# Patient Record
Sex: FEMALE | Race: WHITE | NOT HISPANIC OR LATINO | URBAN - METROPOLITAN AREA
[De-identification: names, ages, dates, MRNs, and addresses within clinical notes are randomized per-mention and may not be internally consistent; named-entity substitution may affect disease eponyms.]

---

## 2017-01-05 ENCOUNTER — IMPORTED ENCOUNTER (OUTPATIENT)
Dept: URBAN - METROPOLITAN AREA CLINIC 50 | Facility: CLINIC | Age: 72
End: 2017-01-05

## 2017-01-12 ENCOUNTER — IMPORTED ENCOUNTER (OUTPATIENT)
Dept: URBAN - METROPOLITAN AREA CLINIC 50 | Facility: CLINIC | Age: 72
End: 2017-01-12

## 2017-01-27 ENCOUNTER — IMPORTED ENCOUNTER (OUTPATIENT)
Dept: URBAN - METROPOLITAN AREA CLINIC 50 | Facility: CLINIC | Age: 72
End: 2017-01-27

## 2017-01-27 NOTE — PATIENT DISCUSSION
"""Call if vision decreases or RD warning signs increases/RD warnings given.  No signs of right eye retinal tear

## 2017-05-31 ENCOUNTER — IMPORTED ENCOUNTER (OUTPATIENT)
Dept: URBAN - METROPOLITAN AREA CLINIC 50 | Facility: CLINIC | Age: 72
End: 2017-05-31

## 2017-06-26 ENCOUNTER — IMPORTED ENCOUNTER (OUTPATIENT)
Dept: URBAN - METROPOLITAN AREA CLINIC 50 | Facility: CLINIC | Age: 72
End: 2017-06-26

## 2017-07-06 ENCOUNTER — IMPORTED ENCOUNTER (OUTPATIENT)
Dept: URBAN - METROPOLITAN AREA CLINIC 50 | Facility: CLINIC | Age: 72
End: 2017-07-06

## 2017-07-24 ENCOUNTER — IMPORTED ENCOUNTER (OUTPATIENT)
Dept: URBAN - METROPOLITAN AREA CLINIC 50 | Facility: CLINIC | Age: 72
End: 2017-07-24

## 2017-07-25 ENCOUNTER — IMPORTED ENCOUNTER (OUTPATIENT)
Dept: URBAN - METROPOLITAN AREA CLINIC 50 | Facility: CLINIC | Age: 72
End: 2017-07-25

## 2017-08-04 ENCOUNTER — IMPORTED ENCOUNTER (OUTPATIENT)
Dept: URBAN - METROPOLITAN AREA CLINIC 50 | Facility: CLINIC | Age: 72
End: 2017-08-04

## 2017-08-16 ENCOUNTER — IMPORTED ENCOUNTER (OUTPATIENT)
Dept: URBAN - METROPOLITAN AREA CLINIC 50 | Facility: CLINIC | Age: 72
End: 2017-08-16

## 2017-08-23 ENCOUNTER — IMPORTED ENCOUNTER (OUTPATIENT)
Dept: URBAN - METROPOLITAN AREA CLINIC 50 | Facility: CLINIC | Age: 72
End: 2017-08-23

## 2017-08-30 ENCOUNTER — IMPORTED ENCOUNTER (OUTPATIENT)
Dept: URBAN - METROPOLITAN AREA CLINIC 50 | Facility: CLINIC | Age: 72
End: 2017-08-30

## 2017-09-26 ENCOUNTER — IMPORTED ENCOUNTER (OUTPATIENT)
Dept: URBAN - METROPOLITAN AREA CLINIC 50 | Facility: CLINIC | Age: 72
End: 2017-09-26

## 2018-01-30 ENCOUNTER — IMPORTED ENCOUNTER (OUTPATIENT)
Dept: URBAN - METROPOLITAN AREA CLINIC 50 | Facility: CLINIC | Age: 73
End: 2018-01-30

## 2019-04-08 ENCOUNTER — IMPORTED ENCOUNTER (OUTPATIENT)
Dept: URBAN - METROPOLITAN AREA CLINIC 50 | Facility: CLINIC | Age: 74
End: 2019-04-08

## 2019-04-09 ENCOUNTER — IMPORTED ENCOUNTER (OUTPATIENT)
Dept: URBAN - METROPOLITAN AREA CLINIC 50 | Facility: CLINIC | Age: 74
End: 2019-04-09

## 2019-04-30 ENCOUNTER — IMPORTED ENCOUNTER (OUTPATIENT)
Dept: URBAN - METROPOLITAN AREA CLINIC 50 | Facility: CLINIC | Age: 74
End: 2019-04-30

## 2019-06-03 ENCOUNTER — IMPORTED ENCOUNTER (OUTPATIENT)
Dept: URBAN - METROPOLITAN AREA CLINIC 50 | Facility: CLINIC | Age: 74
End: 2019-06-03

## 2019-06-03 NOTE — PATIENT DISCUSSION
"""Phaco with IOL OS: 06/11/2019 Brien FRIED ZLB00 21.50 Target: [facility] [place]

## 2019-06-11 ENCOUNTER — IMPORTED ENCOUNTER (OUTPATIENT)
Dept: URBAN - METROPOLITAN AREA CLINIC 50 | Facility: CLINIC | Age: 74
End: 2019-06-11

## 2019-06-11 NOTE — PATIENT DISCUSSION
"""S/P IOL OS: Tecnis  ZLB00 21.50 (Target: [place]) +Femto/Arcs +Omidria. Continue post operative instructions and drops per schedule.  """

## 2019-06-17 ENCOUNTER — IMPORTED ENCOUNTER (OUTPATIENT)
Dept: URBAN - METROPOLITAN AREA CLINIC 50 | Facility: CLINIC | Age: 74
End: 2019-06-17

## 2019-06-25 ENCOUNTER — IMPORTED ENCOUNTER (OUTPATIENT)
Dept: URBAN - METROPOLITAN AREA CLINIC 50 | Facility: CLINIC | Age: 74
End: 2019-06-25

## 2019-06-27 ENCOUNTER — IMPORTED ENCOUNTER (OUTPATIENT)
Dept: URBAN - METROPOLITAN AREA CLINIC 50 | Facility: CLINIC | Age: 74
End: 2019-06-27

## 2019-06-28 ENCOUNTER — IMPORTED ENCOUNTER (OUTPATIENT)
Dept: URBAN - METROPOLITAN AREA CLINIC 50 | Facility: CLINIC | Age: 74
End: 2019-06-28

## 2019-07-01 ENCOUNTER — IMPORTED ENCOUNTER (OUTPATIENT)
Dept: URBAN - METROPOLITAN AREA CLINIC 50 | Facility: CLINIC | Age: 74
End: 2019-07-01

## 2019-07-23 ENCOUNTER — IMPORTED ENCOUNTER (OUTPATIENT)
Dept: URBAN - METROPOLITAN AREA CLINIC 50 | Facility: CLINIC | Age: 74
End: 2019-07-23

## 2019-07-23 NOTE — PATIENT DISCUSSION
"""S/P IOL OU: OD: Symfony ZXR00 +20.5 (Target: [place]) +ORAFemto/ArcsOmidria. OS: Tecnis MF ZLB00 21.50 (Target: [place])/Arcs. "

## 2019-07-31 ENCOUNTER — IMPORTED ENCOUNTER (OUTPATIENT)
Dept: URBAN - METROPOLITAN AREA CLINIC 50 | Facility: CLINIC | Age: 74
End: 2019-07-31

## 2019-10-28 ENCOUNTER — IMPORTED ENCOUNTER (OUTPATIENT)
Dept: URBAN - METROPOLITAN AREA CLINIC 50 | Facility: CLINIC | Age: 74
End: 2019-10-28

## 2019-10-28 NOTE — PATIENT DISCUSSION
"""Continue Preservative free tears both eyes two - four times a day ."" ""Continue Warm compresses left eye BID-TID ."" ""Continue Lid Scrubs both eyes twice a day ."" ""Continue Gel drops both eyes at bedtime ."""

## 2019-11-15 ENCOUNTER — IMPORTED ENCOUNTER (OUTPATIENT)
Dept: URBAN - METROPOLITAN AREA CLINIC 50 | Facility: CLINIC | Age: 74
End: 2019-11-15

## 2019-12-23 ENCOUNTER — IMPORTED ENCOUNTER (OUTPATIENT)
Dept: URBAN - METROPOLITAN AREA CLINIC 50 | Facility: CLINIC | Age: 74
End: 2019-12-23

## 2020-06-08 ENCOUNTER — IMPORTED ENCOUNTER (OUTPATIENT)
Dept: URBAN - METROPOLITAN AREA CLINIC 50 | Facility: CLINIC | Age: 75
End: 2020-06-08

## 2020-06-08 NOTE — PATIENT DISCUSSION
"""[facility] OU on exam today."" ""Continue Artificial tears both eyes two - four times a day ."" ""Continue Warm compresses both eyes twice a day

## 2020-07-20 ENCOUNTER — IMPORTED ENCOUNTER (OUTPATIENT)
Dept: URBAN - METROPOLITAN AREA CLINIC 50 | Facility: CLINIC | Age: 75
End: 2020-07-20

## 2020-09-10 ENCOUNTER — IMPORTED ENCOUNTER (OUTPATIENT)
Dept: URBAN - METROPOLITAN AREA CLINIC 50 | Facility: CLINIC | Age: 75
End: 2020-09-10

## 2020-12-07 ENCOUNTER — IMPORTED ENCOUNTER (OUTPATIENT)
Dept: URBAN - METROPOLITAN AREA CLINIC 50 | Facility: CLINIC | Age: 75
End: 2020-12-07

## 2021-04-17 ASSESSMENT — TONOMETRY
OS_IOP_MMHG: 13
OS_IOP_MMHG: 14
OS_IOP_MMHG: 16
OD_IOP_MMHG: 14
OS_IOP_MMHG: 13
OD_IOP_MMHG: 13
OD_IOP_MMHG: 16
OS_IOP_MMHG: 13
OD_IOP_MMHG: 14
OS_IOP_MMHG: 12
OD_IOP_MMHG: 16
OD_IOP_MMHG: 14
OS_IOP_MMHG: 14
OD_IOP_MMHG: 14
OS_IOP_MMHG: 16
OS_IOP_MMHG: 14
OS_IOP_MMHG: 14
OS_IOP_MMHG: 16
OD_IOP_MMHG: 15
OD_IOP_MMHG: 14
OS_IOP_MMHG: 14
OD_IOP_MMHG: 14
OD_IOP_MMHG: 13
OD_IOP_MMHG: 14
OS_IOP_MMHG: 15
OS_IOP_MMHG: 11
OS_IOP_MMHG: 21
OD_IOP_MMHG: 14
OD_IOP_MMHG: 14
OS_IOP_MMHG: 15
OS_IOP_MMHG: 14
OS_IOP_MMHG: 15
OS_IOP_MMHG: 15
OD_IOP_MMHG: 14
OD_IOP_MMHG: 15
OS_IOP_MMHG: 15
OS_IOP_MMHG: 14
OD_IOP_MMHG: 13
OD_IOP_MMHG: 14
OD_IOP_MMHG: 15
OD_IOP_MMHG: 12
OS_IOP_MMHG: 12
OD_IOP_MMHG: 13
OS_IOP_MMHG: 14
OD_IOP_MMHG: 2

## 2021-04-17 ASSESSMENT — VISUAL ACUITY
OD_CC: J1@ 16 IN
OS_CC: 20/20-2
OS_SC: 20/25+2
OS_SC: 20/30-
OD_CC: J1+
OS_OTHER: 20/70-. 20/400.
OD_SC: 20/200
OD_CC: J1+ -2
OD_PH: 20/20-
OD_OTHER: 20/70-. 20/400.
OS_CC: J1+ -2
OS_SC: 20/50-
OD_SC: 20/30-
OS_SC: 20/40+
OD_PH: 20/30+2
OD_SC: 20/30
OS_BAT: 20/80
OD_SC: 20/30-1
OS_BAT: 20/80
OS_PH: @ 16 IN
OD_SC: 20/40
OS_SC: 20/30-
OD_SC: 20/30+2
OD_PH: @ 16 IN
OS_SC: 20/40+-
OS_CC: J1+@ 16 IN
OD_SC: 20/30+-
OS_PH: 20/30
OD_SC: 20/30-
OD_SC: 20/30+2
OD_BAT: 20/30
OS_OTHER: 20/70-. >20/400.
OS_SC: 20/40
OD_PH: 20/25-1
OS_SC: 20/30-
OS_OTHER: 20/80. 20/400.
OS_PH: @ 16 IN
OS_SC: 20/30-2
OD_BAT: 20/80
OD_SC: 20/30-1
OS_BAT: 20/80
OD_SC: 20/40-2
OD_SC: 20/40++
OD_BAT: 20/80
OS_SC: 20/20
OD_PH: @ 16 IN
OD_BAT: 20/70-
OD_SC: 20/40
OD_CC: J1+
OD_PH: 20/30
OD_SC: 20/40
OD_OTHER: 20/30. 20/40.
OD_PH: 20/25-2
OS_BAT: 20/70-
OS_OTHER: 20/25. 20/30.
OD_PH: @ 16 IN
OD_BAT: 20/25
OS_SC: 20/30-
OS_PH: @ 16 IN
OS_SC: 20/25-3
OS_CC: 20/60
OD_OTHER: 20/25. 20/50.
OD_CC: J1+@ 16 IN
OS_CC: J1+
OD_CC: J1+
OS_SC: 20/40
OD_SC: 20/30-+
OS_CC: J1+
OD_BAT: 20/70-
OS_OTHER: 20/30. 20/40.
OS_SC: 20/40+
OS_CC: J1@ 16 IN
OS_SC: 20/25-2
OD_SC: 20/40
OD_PH: 20/30-
OS_PH: 20/30-
OD_OTHER: 20/80. 20/400.
OD_SC: 20/30
OS_SC: 20/30
OD_SC: 20/40-
OD_SC: 20/30-
OS_BAT: 20/70-
OS_SC: 20/30-
OS_SC: 20/30
OD_PH: 20/25
OS_OTHER: 20/80. 20/80.
OS_SC: 20/25
OD_OTHER: 20/80. 20/400.
OD_BAT: 20/80
OD_SC: 20/30
OD_SC: 20/30-2
OD_OTHER: 20/80. 20/400.
OD_SC: 20/25
OS_BAT: 20/25
OS_SC: 20/30
OD_CC: 20/40+2
OS_SC: 20/30-2
OS_CC: J1+
OD_CC: J1+@ 16 IN
OS_PH: 20/30
OS_BAT: 20/30
OS_CC: J1+@ 16 IN
OD_OTHER: 20/70-. 20/400.
OS_OTHER: 20/80. 20/80.
OS_SC: 20/25-1

## 2021-04-17 ASSESSMENT — PACHYMETRY
OS_CT_UM: 552
OD_CT_UM: 555
OS_CT_UM: 552
OD_CT_UM: 555
OS_CT_UM: 552
OD_CT_UM: 555
OS_CT_UM: 552
OD_CT_UM: 555
OS_CT_UM: 552
OS_CT_UM: 552
OD_CT_UM: 555
OS_CT_UM: 552
OD_CT_UM: 555
OS_CT_UM: 552
OD_CT_UM: 555
OS_CT_UM: 552
OD_CT_UM: 555

## 2021-12-06 ENCOUNTER — COMPREHENSIVE EXAM (OUTPATIENT)
Dept: URBAN - METROPOLITAN AREA CLINIC 49 | Facility: CLINIC | Age: 76
End: 2021-12-06

## 2021-12-06 DIAGNOSIS — H35.373: ICD-10-CM

## 2021-12-06 DIAGNOSIS — H43.813: ICD-10-CM

## 2021-12-06 DIAGNOSIS — H16.223: ICD-10-CM

## 2021-12-06 PROCEDURE — 92014 COMPRE OPH EXAM EST PT 1/>: CPT

## 2021-12-06 PROCEDURE — 92134 CPTRZ OPH DX IMG PST SGM RTA: CPT

## 2021-12-06 ASSESSMENT — TONOMETRY
OS_IOP_MMHG: 12
OS_IOP_MMHG: 12
OD_IOP_MMHG: 12
OD_IOP_MMHG: 11

## 2021-12-06 ASSESSMENT — VISUAL ACUITY
OS_PH: 20/40
OD_SC: 20/50
OU_SC: J1+ @ 14IN
OS_SC: J1+ @ 14IN
OS_SC: 20/60-1
OD_SC: J1 @ 14IN
OU_SC: 20/40-1
OD_PH: 20/30

## 2022-06-13 ENCOUNTER — COMPREHENSIVE EXAM (OUTPATIENT)
Dept: URBAN - METROPOLITAN AREA CLINIC 49 | Facility: CLINIC | Age: 77
End: 2022-06-13

## 2022-06-13 DIAGNOSIS — H35.373: ICD-10-CM

## 2022-06-13 DIAGNOSIS — H43.813: ICD-10-CM

## 2022-06-13 DIAGNOSIS — H16.223: ICD-10-CM

## 2022-06-13 PROCEDURE — 92014 COMPRE OPH EXAM EST PT 1/>: CPT

## 2022-06-13 PROCEDURE — 92134 CPTRZ OPH DX IMG PST SGM RTA: CPT

## 2022-06-13 ASSESSMENT — VISUAL ACUITY
OS_PH: 20/25
OU_SC: J1+-2
OD_SC: 20/25-2
OS_SC: 20/30

## 2022-06-13 ASSESSMENT — TONOMETRY
OD_IOP_MMHG: 12
OS_IOP_MMHG: 11
OD_IOP_MMHG: 11
OS_IOP_MMHG: 12

## 2023-07-10 ENCOUNTER — COMPREHENSIVE EXAM (OUTPATIENT)
Dept: URBAN - METROPOLITAN AREA CLINIC 49 | Facility: CLINIC | Age: 78
End: 2023-07-10

## 2023-07-10 DIAGNOSIS — H02.831: ICD-10-CM

## 2023-07-10 DIAGNOSIS — H16.223: ICD-10-CM

## 2023-07-10 DIAGNOSIS — H35.373: ICD-10-CM

## 2023-07-10 DIAGNOSIS — H52.4: ICD-10-CM

## 2023-07-10 DIAGNOSIS — H02.834: ICD-10-CM

## 2023-07-10 DIAGNOSIS — H43.813: ICD-10-CM

## 2023-07-10 PROCEDURE — 92134 CPTRZ OPH DX IMG PST SGM RTA: CPT

## 2023-07-10 PROCEDURE — 92015 DETERMINE REFRACTIVE STATE: CPT

## 2023-07-10 PROCEDURE — 92014 COMPRE OPH EXAM EST PT 1/>: CPT

## 2023-07-10 ASSESSMENT — TONOMETRY
OS_IOP_MMHG: 14
OD_IOP_MMHG: 12
OD_IOP_MMHG: 11
OS_IOP_MMHG: 14

## 2023-07-10 ASSESSMENT — VISUAL ACUITY
OS_SC: 20/50
OD_SC: 20/25-2
OU_SC: 20/25-2
OU_SC: J1